# Patient Record
Sex: MALE
[De-identification: names, ages, dates, MRNs, and addresses within clinical notes are randomized per-mention and may not be internally consistent; named-entity substitution may affect disease eponyms.]

---

## 2017-01-11 ENCOUNTER — APPOINTMENT (OUTPATIENT)
Dept: DERMATOLOGY | Facility: CLINIC | Age: 77
End: 2017-01-11

## 2018-01-10 ENCOUNTER — APPOINTMENT (OUTPATIENT)
Dept: DERMATOLOGY | Facility: CLINIC | Age: 78
End: 2018-01-10
Payer: MEDICARE

## 2018-01-10 PROCEDURE — 99214 OFFICE O/P EST MOD 30 MIN: CPT

## 2018-06-18 ENCOUNTER — NON-APPOINTMENT (OUTPATIENT)
Age: 78
End: 2018-06-18

## 2018-06-18 ENCOUNTER — APPOINTMENT (OUTPATIENT)
Dept: INTERNAL MEDICINE | Facility: CLINIC | Age: 78
End: 2018-06-18
Payer: MEDICARE

## 2018-06-18 VITALS — RESPIRATION RATE: 12 BRPM | DIASTOLIC BLOOD PRESSURE: 68 MMHG | HEART RATE: 60 BPM | SYSTOLIC BLOOD PRESSURE: 122 MMHG

## 2018-06-18 VITALS — WEIGHT: 152 LBS | HEIGHT: 70 IN | BODY MASS INDEX: 21.76 KG/M2

## 2018-06-18 DIAGNOSIS — Z82.3 FAMILY HISTORY OF STROKE: ICD-10-CM

## 2018-06-18 DIAGNOSIS — I49.1 ATRIAL PREMATURE DEPOLARIZATION: ICD-10-CM

## 2018-06-18 DIAGNOSIS — Z78.9 OTHER SPECIFIED HEALTH STATUS: ICD-10-CM

## 2018-06-18 PROCEDURE — 82270 OCCULT BLOOD FECES: CPT

## 2018-06-18 PROCEDURE — 36415 COLL VENOUS BLD VENIPUNCTURE: CPT

## 2018-06-18 PROCEDURE — G0439: CPT

## 2018-06-18 PROCEDURE — G0438: CPT

## 2018-06-18 PROCEDURE — 93000 ELECTROCARDIOGRAM COMPLETE: CPT

## 2018-06-18 NOTE — HEALTH RISK ASSESSMENT
[Excellent] : ~his/her~  mood as  excellent [No falls in past year] : Patient reported no falls in the past year [HIV test declined] : HIV test declined [College] : College [] :  [# Of Children ___] : has [unfilled] children [Sexually Active] : sexually active [Fully functional (bathing, dressing, toileting, transferring, walking, feeding)] : Fully functional (bathing, dressing, toileting, transferring, walking, feeding) [Fully functional (using the telephone, shopping, preparing meals, housekeeping, doing laundry, using] : Fully functional and needs no help or supervision to perform IADLs (using the telephone, shopping, preparing meals, housekeeping, doing laundry, using transportation, managing medications and managing finances) [Reports changes in hearing] : Reports changes in hearing [Smoke Detector] : smoke detector [Safety elements used in home] : safety elements used in home [Patient declined discussion] : Patient declined discussion [] : No [Change in mental status noted] : No change in mental status noted [Language] : denies difficulty with language [Behavior] : denies difficulty with behavior [Learning/Retaining New Information] : denies difficulty learning/retaining new information [Handling Complex Tasks] : denies difficulty handling complex tasks [Reasoning] : denies difficulty with reasoning [Spatial Ability and Orientation] : denies difficulty with spatial ability and orientation [High Risk Behavior] : no high risk behavior [Reports changes in vision] : Reports no changes in vision [Reports changes in dental health] : Reports no changes in dental health [Travel to Developing Areas] : does not  travel to developing areas [ColonoscopyDate] : 205 [de-identified] : uses hearin aides

## 2018-06-18 NOTE — ASSESSMENT
[FreeTextEntry1] : check labs\par ekg with occasional pac, not pathologic\par if gets further light headedness consider holter\par

## 2018-06-18 NOTE — HISTORY OF PRESENT ILLNESS
[FreeTextEntry1] : For CPE [de-identified] : For CPE.  Has no health issues to report\par He has no chest pain sob nvd, had recent uri and has felt a bit light headed on occaion\par No palpitations or other symptoms ot report

## 2018-06-18 NOTE — PHYSICAL EXAM

## 2018-06-19 LAB
25(OH)D3 SERPL-MCNC: 47.7 NG/ML
ALBUMIN SERPL ELPH-MCNC: 4.5 G/DL
ALP BLD-CCNC: 59 U/L
ALT SERPL-CCNC: 16 U/L
ANION GAP SERPL CALC-SCNC: 12 MMOL/L
APPEARANCE: CLEAR
AST SERPL-CCNC: 21 U/L
BASOPHILS # BLD AUTO: 0.03 K/UL
BASOPHILS NFR BLD AUTO: 0.5 %
BILIRUB SERPL-MCNC: 0.4 MG/DL
BILIRUBIN URINE: NEGATIVE
BLOOD URINE: NEGATIVE
BUN SERPL-MCNC: 25 MG/DL
CALCIUM SERPL-MCNC: 9.5 MG/DL
CHLORIDE SERPL-SCNC: 105 MMOL/L
CHOLEST SERPL-MCNC: 178 MG/DL
CHOLEST/HDLC SERPL: 3.2 RATIO
CO2 SERPL-SCNC: 26 MMOL/L
COLOR: YELLOW
CREAT SERPL-MCNC: 1.01 MG/DL
CREAT SPEC-SCNC: 130 MG/DL
EOSINOPHIL # BLD AUTO: 0.16 K/UL
EOSINOPHIL NFR BLD AUTO: 2.7 %
GLUCOSE QUALITATIVE U: NEGATIVE MG/DL
GLUCOSE SERPL-MCNC: 103 MG/DL
HBA1C MFR BLD HPLC: 5.5 %
HCT VFR BLD CALC: 42.9 %
HDLC SERPL-MCNC: 55 MG/DL
HGB BLD-MCNC: 13.2 G/DL
IMM GRANULOCYTES NFR BLD AUTO: 0.2 %
KETONES URINE: ABNORMAL
LDLC SERPL CALC-MCNC: 104 MG/DL
LEUKOCYTE ESTERASE URINE: NEGATIVE
LYMPHOCYTES # BLD AUTO: 1.22 K/UL
LYMPHOCYTES NFR BLD AUTO: 20.2 %
MAN DIFF?: NORMAL
MCHC RBC-ENTMCNC: 29.3 PG
MCHC RBC-ENTMCNC: 30.8 GM/DL
MCV RBC AUTO: 95.1 FL
MICROALBUMIN 24H UR DL<=1MG/L-MCNC: 0.5 MG/DL
MICROALBUMIN/CREAT 24H UR-RTO: 4 MG/G
MONOCYTES # BLD AUTO: 0.38 K/UL
MONOCYTES NFR BLD AUTO: 6.3 %
NEUTROPHILS # BLD AUTO: 4.23 K/UL
NEUTROPHILS NFR BLD AUTO: 70.1 %
NITRITE URINE: NEGATIVE
PH URINE: 5.5
PLATELET # BLD AUTO: 285 K/UL
POTASSIUM SERPL-SCNC: 4.9 MMOL/L
PROT SERPL-MCNC: 7 G/DL
PROTEIN URINE: NEGATIVE MG/DL
PSA SERPL-MCNC: 0.71 NG/ML
RBC # BLD: 4.51 M/UL
RBC # FLD: 14.4 %
SODIUM SERPL-SCNC: 143 MMOL/L
SPECIFIC GRAVITY URINE: 1.02
T4 FREE SERPL-MCNC: 1.2 NG/DL
TRIGL SERPL-MCNC: 93 MG/DL
TSH SERPL-ACNC: 2.31 UIU/ML
UROBILINOGEN URINE: NEGATIVE MG/DL
WBC # FLD AUTO: 6.03 K/UL

## 2018-07-13 ENCOUNTER — RECORD ABSTRACTING (OUTPATIENT)
Age: 78
End: 2018-07-13

## 2018-10-29 ENCOUNTER — APPOINTMENT (OUTPATIENT)
Dept: GASTROENTEROLOGY | Facility: CLINIC | Age: 78
End: 2018-10-29
Payer: MEDICARE

## 2018-10-29 VITALS
OXYGEN SATURATION: 98 % | WEIGHT: 156 LBS | HEART RATE: 62 BPM | BODY MASS INDEX: 22.33 KG/M2 | DIASTOLIC BLOOD PRESSURE: 88 MMHG | SYSTOLIC BLOOD PRESSURE: 126 MMHG | HEIGHT: 70 IN | RESPIRATION RATE: 16 BRPM

## 2018-10-29 PROCEDURE — 99213 OFFICE O/P EST LOW 20 MIN: CPT

## 2019-01-11 ENCOUNTER — APPOINTMENT (OUTPATIENT)
Dept: DERMATOLOGY | Facility: CLINIC | Age: 79
End: 2019-01-11
Payer: MEDICARE

## 2019-01-11 PROCEDURE — 99213 OFFICE O/P EST LOW 20 MIN: CPT

## 2019-07-16 ENCOUNTER — NON-APPOINTMENT (OUTPATIENT)
Age: 79
End: 2019-07-16

## 2019-07-16 ENCOUNTER — APPOINTMENT (OUTPATIENT)
Dept: INTERNAL MEDICINE | Facility: CLINIC | Age: 79
End: 2019-07-16
Payer: MEDICARE

## 2019-07-16 VITALS
BODY MASS INDEX: 22.19 KG/M2 | HEART RATE: 68 BPM | DIASTOLIC BLOOD PRESSURE: 82 MMHG | HEIGHT: 70 IN | SYSTOLIC BLOOD PRESSURE: 120 MMHG | WEIGHT: 155 LBS | RESPIRATION RATE: 12 BRPM

## 2019-07-16 PROCEDURE — 36415 COLL VENOUS BLD VENIPUNCTURE: CPT

## 2019-07-16 PROCEDURE — 93000 ELECTROCARDIOGRAM COMPLETE: CPT

## 2019-07-16 PROCEDURE — G0439: CPT

## 2019-07-16 NOTE — ASSESSMENT
[FreeTextEntry1] : medically stable\par check labs\par flu vaccine in the fall\par due for colonsocopy this fall

## 2019-07-16 NOTE — PHYSICAL EXAM
[No Acute Distress] : no acute distress [Well Nourished] : well nourished [Well Developed] : well developed [Well-Appearing] : well-appearing [Normal Sclera/Conjunctiva] : normal sclera/conjunctiva [PERRL] : pupils equal round and reactive to light [EOMI] : extraocular movements intact [Normal Outer Ear/Nose] : the outer ears and nose were normal in appearance [Normal Oropharynx] : the oropharynx was normal [No JVD] : no jugular venous distention [No Lymphadenopathy] : no lymphadenopathy [Supple] : supple [Thyroid Normal, No Nodules] : the thyroid was normal and there were no nodules present [No Respiratory Distress] : no respiratory distress  [No Accessory Muscle Use] : no accessory muscle use [Clear to Auscultation] : lungs were clear to auscultation bilaterally [Normal Rate] : normal rate  [Regular Rhythm] : with a regular rhythm [Normal S1, S2] : normal S1 and S2 [No Murmur] : no murmur heard [No Carotid Bruits] : no carotid bruits [No Abdominal Bruit] : a ~M bruit was not heard ~T in the abdomen [No Varicosities] : no varicosities [Pedal Pulses Present] : the pedal pulses are present [No Edema] : there was no peripheral edema [No Palpable Aorta] : no palpable aorta [No Extremity Clubbing/Cyanosis] : no extremity clubbing/cyanosis [Soft] : abdomen soft [Non Tender] : non-tender [Non-distended] : non-distended [No Masses] : no abdominal mass palpated [No HSM] : no HSM [Normal Bowel Sounds] : normal bowel sounds [Normal Posterior Cervical Nodes] : no posterior cervical lymphadenopathy [Normal Anterior Cervical Nodes] : no anterior cervical lymphadenopathy [No CVA Tenderness] : no CVA  tenderness [No Spinal Tenderness] : no spinal tenderness [No Joint Swelling] : no joint swelling [Grossly Normal Strength/Tone] : grossly normal strength/tone [No Rash] : no rash [Coordination Grossly Intact] : coordination grossly intact [No Focal Deficits] : no focal deficits [Normal Gait] : normal gait [Deep Tendon Reflexes (DTR)] : deep tendon reflexes were 2+ and symmetric [Normal Affect] : the affect was normal [Normal Insight/Judgement] : insight and judgment were intact [FreeTextEntry1] : flat prostate

## 2019-07-16 NOTE — HEALTH RISK ASSESSMENT
[Excellent] : ~his/her~  mood as  excellent [Yes] : Yes [1 or 2 (0 pts)] : 1 or 2 (0 points) [No falls in past year] : Patient reported no falls in the past year [0] : 2) Feeling down, depressed, or hopeless: Not at all (0) [None] : None [Employed] : employed [Retired] : retired [Sexually Active] : sexually active [Feels Safe at Home] : Feels safe at home [Fully functional (bathing, dressing, toileting, transferring, walking, feeding)] : Fully functional (bathing, dressing, toileting, transferring, walking, feeding) [Reports changes in dental health] : Reports changes in dental health [Safety elements used in home] : safety elements used in home [Seat Belt] :  uses seat belt [Sunscreen] : uses sunscreen [With Patient/Caregiver] : With Patient/Caregiver [Designated Healthcare Proxy] : Designated healthcare proxy [Name: ___] : Health Care Proxy's Name: [unfilled]  [Relationship: ___] : Relationship: [unfilled] [I will adhere to the patient's wishes as expressed in the advance directive except as noted below.] : I will adhere to the patient's wishes as expressed in the advance directive except as noted below [] : No [Change in mental status noted] : No change in mental status noted [Language] : denies difficulty with language [Behavior] : denies difficulty with behavior [Learning/Retaining New Information] : denies difficulty learning/retaining new information [Handling Complex Tasks] : denies difficulty handling complex tasks [Reasoning] : denies difficulty with reasoning [Spatial Ability and Orientation] : denies difficulty with spatial ability and orientation [High Risk Behavior] : no high risk behavior [Reports changes in hearing] : Reports no changes in hearing [Reports changes in vision] : Reports no changes in vision [Reports normal functional visual acuity (ie: able to read med bottle)] : Reports poor functional visual acuity.  [Smoke Detector] : no smoke detector [Carbon Monoxide Detector] : no carbon monoxide detector [Guns at Home] : no guns at home [Travel to Developing Areas] : does not  travel to developing areas [ColonoscopyDate] : 2016 [ColonoscopyComments] : polyps [de-identified] : uses hearing aids [AdvancecareDate] : 7/16/17

## 2019-07-17 LAB
25(OH)D3 SERPL-MCNC: 40.1 NG/ML
ALBUMIN SERPL ELPH-MCNC: 4.2 G/DL
ALP BLD-CCNC: 52 U/L
ALT SERPL-CCNC: 14 U/L
ANION GAP SERPL CALC-SCNC: 15 MMOL/L
APPEARANCE: CLEAR
AST SERPL-CCNC: 18 U/L
BASOPHILS # BLD AUTO: 0.05 K/UL
BASOPHILS NFR BLD AUTO: 0.8 %
BILIRUB SERPL-MCNC: 0.4 MG/DL
BILIRUBIN URINE: NEGATIVE
BLOOD URINE: NEGATIVE
BUN SERPL-MCNC: 26 MG/DL
CALCIUM SERPL-MCNC: 9.6 MG/DL
CHLORIDE SERPL-SCNC: 106 MMOL/L
CHOLEST SERPL-MCNC: 170 MG/DL
CHOLEST/HDLC SERPL: 3.5 RATIO
CO2 SERPL-SCNC: 24 MMOL/L
COLOR: YELLOW
CREAT SERPL-MCNC: 1.09 MG/DL
CREAT SPEC-SCNC: 151 MG/DL
EOSINOPHIL # BLD AUTO: 0.2 K/UL
EOSINOPHIL NFR BLD AUTO: 3.3 %
ESTIMATED AVERAGE GLUCOSE: 105 MG/DL
GLUCOSE QUALITATIVE U: NEGATIVE
GLUCOSE SERPL-MCNC: 82 MG/DL
HBA1C MFR BLD HPLC: 5.3 %
HCT VFR BLD CALC: 42.9 %
HDLC SERPL-MCNC: 48 MG/DL
HGB BLD-MCNC: 13.3 G/DL
IMM GRANULOCYTES NFR BLD AUTO: 0.2 %
KETONES URINE: NEGATIVE
LDLC SERPL CALC-MCNC: 100 MG/DL
LEUKOCYTE ESTERASE URINE: NEGATIVE
LYMPHOCYTES # BLD AUTO: 1.2 K/UL
LYMPHOCYTES NFR BLD AUTO: 19.5 %
MAN DIFF?: NORMAL
MCHC RBC-ENTMCNC: 30.6 PG
MCHC RBC-ENTMCNC: 31 GM/DL
MCV RBC AUTO: 98.8 FL
MICROALBUMIN 24H UR DL<=1MG/L-MCNC: <1.2 MG/DL
MICROALBUMIN/CREAT 24H UR-RTO: NORMAL MG/G
MONOCYTES # BLD AUTO: 0.41 K/UL
MONOCYTES NFR BLD AUTO: 6.7 %
NEUTROPHILS # BLD AUTO: 4.28 K/UL
NEUTROPHILS NFR BLD AUTO: 69.5 %
NITRITE URINE: NEGATIVE
PH URINE: 5.5
PLATELET # BLD AUTO: 242 K/UL
POTASSIUM SERPL-SCNC: 4.2 MMOL/L
PROT SERPL-MCNC: 6.6 G/DL
PROTEIN URINE: NORMAL
PSA SERPL-MCNC: 0.98 NG/ML
RBC # BLD: 4.34 M/UL
RBC # FLD: 13.7 %
SODIUM SERPL-SCNC: 144 MMOL/L
SPECIFIC GRAVITY URINE: 1.03
T4 FREE SERPL-MCNC: 1.2 NG/DL
TRIGL SERPL-MCNC: 110 MG/DL
TSH SERPL-ACNC: 2.46 UIU/ML
UROBILINOGEN URINE: NORMAL
WBC # FLD AUTO: 6.15 K/UL

## 2019-07-18 ENCOUNTER — CHART COPY (OUTPATIENT)
Age: 79
End: 2019-07-18

## 2019-09-27 ENCOUNTER — APPOINTMENT (OUTPATIENT)
Dept: INTERNAL MEDICINE | Facility: CLINIC | Age: 79
End: 2019-09-27
Payer: MEDICARE

## 2019-09-27 PROCEDURE — G0008: CPT

## 2019-09-27 PROCEDURE — 90662 IIV NO PRSV INCREASED AG IM: CPT

## 2019-10-18 ENCOUNTER — APPOINTMENT (OUTPATIENT)
Dept: GASTROENTEROLOGY | Facility: CLINIC | Age: 79
End: 2019-10-18
Payer: MEDICARE

## 2019-10-18 VITALS
OXYGEN SATURATION: 98 % | SYSTOLIC BLOOD PRESSURE: 123 MMHG | HEART RATE: 70 BPM | BODY MASS INDEX: 22.33 KG/M2 | RESPIRATION RATE: 14 BRPM | WEIGHT: 156 LBS | HEIGHT: 70 IN | DIASTOLIC BLOOD PRESSURE: 80 MMHG

## 2019-10-18 DIAGNOSIS — Z86.010 PERSONAL HISTORY OF COLONIC POLYPS: ICD-10-CM

## 2019-10-18 PROCEDURE — 99214 OFFICE O/P EST MOD 30 MIN: CPT

## 2019-10-18 NOTE — HISTORY OF PRESENT ILLNESS
[FreeTextEntry1] : 79-year-old white male, with no significant past medical or surgical history. Patient had undergone a screening colonoscopy. 3+ years ago, showing multiple small tubular adenomas, at least 5 possibly 6. All were resected. No postprocedural issues. Patient has remained asymptomatic from a lower GI point of view, ever since. He is currently taking no medications. He feels completely well. His bowel movements are normal. No abdominal pain, weight loss, rectal bleeding or alteration in bowel habits. No upper GI symptoms.

## 2019-10-18 NOTE — PHYSICAL EXAM
[Sclera] : the sclera and conjunctiva were normal [PERRL With Normal Accommodation] : pupils were equal in size, round, and reactive to light [Extraocular Movements] : extraocular movements were intact [Neck Appearance] : the appearance of the neck was normal [Neck Cervical Mass (___cm)] : no neck mass was observed [Jugular Venous Distention Increased] : there was no jugular-venous distention [Thyroid Diffuse Enlargement] : the thyroid was not enlarged [Thyroid Nodule] : there were no palpable thyroid nodules [Auscultation Breath Sounds / Voice Sounds] : lungs were clear to auscultation bilaterally [Heart Rate And Rhythm] : heart rate was normal and rhythm regular [Heart Sounds] : normal S1 and S2 [Heart Sounds Gallop] : no gallops [Murmurs] : no murmurs [Heart Sounds Pericardial Friction Rub] : no pericardial rub [Bowel Sounds] : normal bowel sounds [Abdomen Soft] : soft [Abdomen Tenderness] : non-tender [Abdomen Mass (___ Cm)] : no abdominal mass palpated [No CVA Tenderness] : no ~M costovertebral angle tenderness [No Spinal Tenderness] : no spinal tenderness [Abnormal Walk] : normal gait [Nail Clubbing] : no clubbing  or cyanosis of the fingernails [Musculoskeletal - Swelling] : no joint swelling seen [Motor Tone] : muscle strength and tone were normal [Skin Color & Pigmentation] : normal skin color and pigmentation [Skin Turgor] : normal skin turgor [] : no rash

## 2019-10-18 NOTE — ASSESSMENT
[FreeTextEntry1] : Personal history of multiple small tubular adenomas found at a screening colonoscopy. 3-1/2 years ago. Patient was advised at that time. Mental colonoscopy in 3 years. As such. He is an appropriate candidate to schedule a surveillance colonoscopy. He is asymptomatic from a lower GI point of view. His physical exam was normal. He is taking no medications. ASA #1 surveillance colonoscopy is indicated. The procedure, its risks, benefits, and prepped, were explained to patient, who understands and is agreeable to proceed. ASA #1. Suprep will be utilized. Blood work from recent physical exam will be reviewed. Patient is in optimal medical condition to undergo planned procedure. Results to follow. Arrangements made.

## 2019-10-18 NOTE — CONSULT LETTER
[Dear  ___] : Dear  [unfilled], [Consult Letter:] : I had the pleasure of evaluating your patient, [unfilled]. [Please see my note below.] : Please see my note below. [Consult Closing:] : Thank you very much for allowing me to participate in the care of this patient.  If you have any questions, please do not hesitate to contact me. [Sincerely,] : Sincerely, [FreeTextEntry1] : Personal history of multiple colon polyps, tubular adenomas. Surveillance colonoscopy is appropriate and has been arranged. [FreeTextEntry3] : Maykel Yanes MD FACG\par Diplomate American Board of Internal Medicine and Gastroenterolgy\par [facility]\par

## 2020-01-09 ENCOUNTER — RESULT REVIEW (OUTPATIENT)
Age: 80
End: 2020-01-09

## 2020-01-10 ENCOUNTER — APPOINTMENT (OUTPATIENT)
Dept: DERMATOLOGY | Facility: CLINIC | Age: 80
End: 2020-01-10
Payer: MEDICARE

## 2020-01-10 PROCEDURE — 11102 TANGNTL BX SKIN SINGLE LES: CPT

## 2020-01-10 PROCEDURE — 99213 OFFICE O/P EST LOW 20 MIN: CPT | Mod: 25

## 2020-01-22 ENCOUNTER — OUTPATIENT (OUTPATIENT)
Dept: OUTPATIENT SERVICES | Facility: HOSPITAL | Age: 80
LOS: 1 days | End: 2020-01-22
Payer: MEDICARE

## 2020-01-22 ENCOUNTER — APPOINTMENT (OUTPATIENT)
Dept: GASTROENTEROLOGY | Facility: GI CENTER | Age: 80
End: 2020-01-22
Payer: MEDICARE

## 2020-01-22 DIAGNOSIS — Z86.010 PERSONAL HISTORY OF COLONIC POLYPS: ICD-10-CM

## 2020-01-22 DIAGNOSIS — K64.8 OTHER HEMORRHOIDS: ICD-10-CM

## 2020-01-22 PROCEDURE — G0105: CPT

## 2020-01-22 PROCEDURE — 45378 DIAGNOSTIC COLONOSCOPY: CPT

## 2020-01-22 NOTE — HISTORY OF PRESENT ILLNESS
[FreeTextEntry1] : 78 yo WM c No PMH PH of colon polyps TA's.  Last colonoscopy 3.5 yrs ago.\par Asymptomatic.  No new  medical issues.  Prior BW was normal.

## 2020-01-22 NOTE — HISTORY OF PRESENT ILLNESS
[FreeTextEntry1] : 80 yo WM c No PMH PH of colon polyps TA's.  Last colonoscopy 3.5 yrs ago.\par Asymptomatic.  No new  medical issues.  Prior BW was normal.

## 2020-01-22 NOTE — PHYSICAL EXAM
[Sclera] : the sclera and conjunctiva were normal [PERRL With Normal Accommodation] : pupils were equal in size, round, and reactive to light [Extraocular Movements] : extraocular movements were intact [Neck Appearance] : the appearance of the neck was normal [Neck Cervical Mass (___cm)] : no neck mass was observed [Jugular Venous Distention Increased] : there was no jugular-venous distention [Thyroid Diffuse Enlargement] : the thyroid was not enlarged [Thyroid Nodule] : there were no palpable thyroid nodules [Auscultation Breath Sounds / Voice Sounds] : lungs were clear to auscultation bilaterally [Heart Rate And Rhythm] : heart rate was normal and rhythm regular [Heart Sounds] : normal S1 and S2 [Heart Sounds Gallop] : no gallops [Heart Sounds Pericardial Friction Rub] : no pericardial rub [Murmurs] : no murmurs [Bowel Sounds] : normal bowel sounds [Abdomen Soft] : soft [Abdomen Tenderness] : non-tender [Abdomen Mass (___ Cm)] : no abdominal mass palpated [No CVA Tenderness] : no ~M costovertebral angle tenderness [No Spinal Tenderness] : no spinal tenderness [Musculoskeletal - Swelling] : no joint swelling seen [Nail Clubbing] : no clubbing  or cyanosis of the fingernails [Abnormal Walk] : normal gait [Skin Color & Pigmentation] : normal skin color and pigmentation [Motor Tone] : muscle strength and tone were normal [] : no rash [Skin Turgor] : normal skin turgor

## 2020-01-22 NOTE — PROCEDURE
[Hx of Colon Polyps] : history of colon polyps [Procedure Explained] : The procedure was explained [Allergies Reviewed] : allergies reviewed. [Risks] : Risks [Alternatives] : alternatives [Benefits] : benefits [Patient] : the patient [Consent Obtained] : written consent was obtained prior to the procedure and is detailed in the patient's record [Bowel Prep Kit] : the patient took the appropriate bowel preparation kit as directed [Automated Blood Pressure Cuff] : automated blood pressure cuff [Approved Diet Followed] : the patient avoided solid foods and adhered to the approved diet list for 24 hours prior to the procedure [Pulse Oximeter] : pulse oximeter [Cardiac Monitor] : cardiac monitor [2] : 2 [Propofol ___ mg IV] : Propofol [unfilled] ~Umg intravenously [Prep Qualtiy: ___] : Prep Quality:  [unfilled] [Withdrawal Time: ___] : Withdrawal Time:  [unfilled] [Left Lateral Decubitus] : The patient was positioned in the left lateral decubitus position [Abnormal Rectum] : a normal rectum [External Hemorrhoids] : no external hemorrhoids [Normal Prostate] : a normal prostate [Cecum (Landmarks/Transillum)] : and guided to the cecum which was identified by the anatomic landmarks of the appendiceal orifice and ileocecal valve and by transillumination in the right lower quadrant [Insufflated] : insufflated [Moderate Difficulty] : with moderate difficulty [Multiple Passes Needed] : after multiple passes [Retroflex View] : a retroflex view of the rectum was performed [AVM] : AVM [Normal] : Normal [Hemorrhoids] : hemorrhoids [Tolerated Well] : the patient tolerated the procedure well [Vital Signs Stable] : the vital signs were stable [No Complications] : There were no complications [de-identified] : [id number] [de-identified] : Suprep; Split.   [de-identified] : Multiple loops and tight turns.  Cecum eventually reached.   [de-identified] : Moderate internal hemorrhoids, grade 2.  Inactive  \par No Proctitis.  Normal retroflexion exam.   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  No bleeding or hx of bleeding.  No intervention done.  No TI .  ICV barely noticable.   [de-identified] : 2 AVM's of the cecum.  Internal hemorrhoids.  No neoplasia.

## 2020-01-22 NOTE — PROCEDURE
[Hx of Colon Polyps] : history of colon polyps [Procedure Explained] : The procedure was explained [Allergies Reviewed] : allergies reviewed. [Benefits] : benefits [Risks] : Risks [Alternatives] : alternatives [Consent Obtained] : written consent was obtained prior to the procedure and is detailed in the patient's record [Patient] : the patient [Automated Blood Pressure Cuff] : automated blood pressure cuff [Approved Diet Followed] : the patient avoided solid foods and adhered to the approved diet list for 24 hours prior to the procedure [Bowel Prep Kit] : the patient took the appropriate bowel preparation kit as directed [Pulse Oximeter] : pulse oximeter [Cardiac Monitor] : cardiac monitor [Propofol ___ mg IV] : Propofol [unfilled] ~Umg intravenously [2] : 2 [Prep Qualtiy: ___] : Prep Quality:  [unfilled] [Withdrawal Time: ___] : Withdrawal Time:  [unfilled] [Abnormal Rectum] : a normal rectum [Left Lateral Decubitus] : The patient was positioned in the left lateral decubitus position [External Hemorrhoids] : no external hemorrhoids [Normal Prostate] : a normal prostate [Cecum (Landmarks/Transillum)] : and guided to the cecum which was identified by the anatomic landmarks of the appendiceal orifice and ileocecal valve and by transillumination in the right lower quadrant [Moderate Difficulty] : with moderate difficulty [Insufflated] : insufflated [Multiple Passes Needed] : after multiple passes [Retroflex View] : a retroflex view of the rectum was performed [AVM] : AVM [Hemorrhoids] : hemorrhoids [Normal] : Normal [Tolerated Well] : the patient tolerated the procedure well [Vital Signs Stable] : the vital signs were stable [No Complications] : There were no complications [de-identified] : [id number] [de-identified] : Suprep; Split.   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  No bleeding or hx of bleeding.  No intervention done.  No TI .  ICV barely noticable.   [de-identified] : Moderate internal hemorrhoids, grade 2.  Inactive  \par No Proctitis.  Normal retroflexion exam.   [de-identified] : Multiple loops and tight turns.  Cecum eventually reached.   [de-identified] : 2 AVM's of the cecum.  Internal hemorrhoids.  No neoplasia.

## 2020-01-22 NOTE — PHYSICAL EXAM
[Sclera] : the sclera and conjunctiva were normal [PERRL With Normal Accommodation] : pupils were equal in size, round, and reactive to light [Extraocular Movements] : extraocular movements were intact [Neck Appearance] : the appearance of the neck was normal [Neck Cervical Mass (___cm)] : no neck mass was observed [Jugular Venous Distention Increased] : there was no jugular-venous distention [Thyroid Diffuse Enlargement] : the thyroid was not enlarged [Thyroid Nodule] : there were no palpable thyroid nodules [Auscultation Breath Sounds / Voice Sounds] : lungs were clear to auscultation bilaterally [Heart Rate And Rhythm] : heart rate was normal and rhythm regular [Heart Sounds] : normal S1 and S2 [Heart Sounds Pericardial Friction Rub] : no pericardial rub [Heart Sounds Gallop] : no gallops [Murmurs] : no murmurs [Abdomen Soft] : soft [Bowel Sounds] : normal bowel sounds [Abdomen Tenderness] : non-tender [Abdomen Mass (___ Cm)] : no abdominal mass palpated [No CVA Tenderness] : no ~M costovertebral angle tenderness [No Spinal Tenderness] : no spinal tenderness [Abnormal Walk] : normal gait [Musculoskeletal - Swelling] : no joint swelling seen [Nail Clubbing] : no clubbing  or cyanosis of the fingernails [Skin Color & Pigmentation] : normal skin color and pigmentation [Motor Tone] : muscle strength and tone were normal [] : no rash [Skin Turgor] : normal skin turgor

## 2020-01-22 NOTE — ASSESSMENT
[FreeTextEntry1] : Surveillance colonoscopy negative for neoplasia.  Plan:  repeat in 5 yrs;  \par \par AVM's:  asymptomatic;  No intervention.  \par \par Moderate internal hemorrhoids:  High fiber diet;  topicals prn.

## 2020-01-29 ENCOUNTER — APPOINTMENT (OUTPATIENT)
Dept: DERMATOLOGY | Facility: CLINIC | Age: 80
End: 2020-01-29
Payer: MEDICARE

## 2020-01-29 PROCEDURE — 17261 DSTRJ MAL LES T/A/L .6-1.0CM: CPT

## 2020-08-19 ENCOUNTER — APPOINTMENT (OUTPATIENT)
Dept: DERMATOLOGY | Facility: CLINIC | Age: 80
End: 2020-08-19
Payer: MEDICARE

## 2020-08-19 PROCEDURE — 17000 DESTRUCT PREMALG LESION: CPT

## 2020-08-19 PROCEDURE — 99214 OFFICE O/P EST MOD 30 MIN: CPT | Mod: 25

## 2020-10-27 ENCOUNTER — APPOINTMENT (OUTPATIENT)
Dept: INTERNAL MEDICINE | Facility: CLINIC | Age: 80
End: 2020-10-27
Payer: MEDICARE

## 2020-10-27 ENCOUNTER — NON-APPOINTMENT (OUTPATIENT)
Age: 80
End: 2020-10-27

## 2020-10-27 VITALS
BODY MASS INDEX: 21.9 KG/M2 | HEART RATE: 68 BPM | HEIGHT: 70 IN | WEIGHT: 153 LBS | SYSTOLIC BLOOD PRESSURE: 112 MMHG | DIASTOLIC BLOOD PRESSURE: 78 MMHG | RESPIRATION RATE: 16 BRPM

## 2020-10-27 DIAGNOSIS — K55.21 ANGIODYSPLASIA OF COLON WITH HEMORRHAGE: ICD-10-CM

## 2020-10-27 DIAGNOSIS — Z23 ENCOUNTER FOR IMMUNIZATION: ICD-10-CM

## 2020-10-27 DIAGNOSIS — Z11.59 ENCOUNTER FOR SCREENING FOR OTHER VIRAL DISEASES: ICD-10-CM

## 2020-10-27 LAB
BASOPHILS # BLD AUTO: 0.05 K/UL
BASOPHILS NFR BLD AUTO: 0.9 %
EOSINOPHIL # BLD AUTO: 0.23 K/UL
EOSINOPHIL NFR BLD AUTO: 4.3 %
HCT VFR BLD CALC: 41.6 %
HGB BLD-MCNC: 13.7 G/DL
IMM GRANULOCYTES NFR BLD AUTO: 0.2 %
LYMPHOCYTES # BLD AUTO: 1.19 K/UL
LYMPHOCYTES NFR BLD AUTO: 22.2 %
MAN DIFF?: NORMAL
MCHC RBC-ENTMCNC: 30 PG
MCHC RBC-ENTMCNC: 32.9 GM/DL
MCV RBC AUTO: 91.2 FL
MONOCYTES # BLD AUTO: 0.32 K/UL
MONOCYTES NFR BLD AUTO: 6 %
NEUTROPHILS # BLD AUTO: 3.55 K/UL
NEUTROPHILS NFR BLD AUTO: 66.4 %
PLATELET # BLD AUTO: 265 K/UL
RBC # BLD: 4.56 M/UL
RBC # FLD: 13.2 %
WBC # FLD AUTO: 5.35 K/UL

## 2020-10-27 PROCEDURE — 90662 IIV NO PRSV INCREASED AG IM: CPT

## 2020-10-27 PROCEDURE — 93000 ELECTROCARDIOGRAM COMPLETE: CPT

## 2020-10-27 PROCEDURE — 36415 COLL VENOUS BLD VENIPUNCTURE: CPT

## 2020-10-27 PROCEDURE — G0439: CPT | Mod: CS

## 2020-10-27 PROCEDURE — G0008: CPT

## 2020-10-27 RX ORDER — SODIUM SULFATE, POTASSIUM SULFATE, MAGNESIUM SULFATE 17.5; 3.13; 1.6 G/ML; G/ML; G/ML
17.5-3.13-1.6 SOLUTION, CONCENTRATE ORAL
Qty: 1 | Refills: 0 | Status: DISCONTINUED | COMMUNITY
Start: 2019-10-18 | End: 2020-10-27

## 2020-10-27 NOTE — ASSESSMENT
[FreeTextEntry1] : check labs\par appears in excellent health\par flu vaccine given\par follow up year\par ekg unchanged occasional PAC

## 2020-10-27 NOTE — HEALTH RISK ASSESSMENT
[Excellent] : ~his/her~  mood as  excellent [] : No [Yes] : Yes [2 - 4 times a month (2 pts)] : 2-4 times a month (2 points) [0] : 2) Feeling down, depressed, or hopeless: Not at all (0) [Change in mental status noted] : No change in mental status noted [Language] : denies difficulty with language [Behavior] : denies difficulty with behavior [Learning/Retaining New Information] : denies difficulty learning/retaining new information [Handling Complex Tasks] : denies difficulty handling complex tasks [Reasoning] : denies difficulty with reasoning [Spatial Ability and Orientation] : denies difficulty with spatial ability and orientation [None] : None [With Significant Other] : lives with significant other [] :  [# Of Children ___] : has [unfilled] children [Sexually Active] : sexually active [High Risk Behavior] : no high risk behavior [Feels Safe at Home] : Feels safe at home [Fully functional (bathing, dressing, toileting, transferring, walking, feeding)] : Fully functional (bathing, dressing, toileting, transferring, walking, feeding) [Fully functional (using the telephone, shopping, preparing meals, housekeeping, doing laundry, using] : Fully functional and needs no help or supervision to perform IADLs (using the telephone, shopping, preparing meals, housekeeping, doing laundry, using transportation, managing medications and managing finances) [Reports changes in hearing] : Reports no changes in hearing [Reports changes in vision] : Reports no changes in vision [Reports changes in dental health] : Reports no changes in dental health [Smoke Detector] : smoke detector [Carbon Monoxide Detector] : carbon monoxide detector [Guns at Home] : no guns at home [Safety elements used in home] : safety elements used in home [Seat Belt] :  uses seat belt [Sunscreen] : uses sunscreen [Travel to Developing Areas] : does not  travel to developing areas [Caregiver Concerns] : does not have caregiver concerns [ColonoscopyDate] : 1/2020 [ColonoscopyComments] : avn [Patient/Caregiver not ready to engage] : Patient/Caregiver not ready to engage [AdvancecareDate] : 10/27/20

## 2020-10-27 NOTE — HISTORY OF PRESENT ILLNESS
[FreeTextEntry1] : For CPE [de-identified] : For CPE\par had colonoscopy this year \par has been well

## 2020-10-28 LAB
25(OH)D3 SERPL-MCNC: 45 NG/ML
ALBUMIN SERPL ELPH-MCNC: 4.3 G/DL
ALP BLD-CCNC: 62 U/L
ALT SERPL-CCNC: 18 U/L
ANION GAP SERPL CALC-SCNC: 11 MMOL/L
APPEARANCE: CLEAR
AST SERPL-CCNC: 21 U/L
BILIRUB SERPL-MCNC: 0.5 MG/DL
BILIRUBIN URINE: NEGATIVE
BLOOD URINE: NEGATIVE
BUN SERPL-MCNC: 18 MG/DL
CALCIUM SERPL-MCNC: 9.5 MG/DL
CHLORIDE SERPL-SCNC: 103 MMOL/L
CHOLEST SERPL-MCNC: 185 MG/DL
CO2 SERPL-SCNC: 25 MMOL/L
COLOR: NORMAL
CREAT SERPL-MCNC: 1 MG/DL
CREAT SPEC-SCNC: 23 MG/DL
ESTIMATED AVERAGE GLUCOSE: 108 MG/DL
GLUCOSE QUALITATIVE U: NEGATIVE
GLUCOSE SERPL-MCNC: 86 MG/DL
HBA1C MFR BLD HPLC: 5.4 %
HDLC SERPL-MCNC: 53 MG/DL
KETONES URINE: NEGATIVE
LDLC SERPL CALC-MCNC: 114 MG/DL
LEUKOCYTE ESTERASE URINE: NEGATIVE
MICROALBUMIN 24H UR DL<=1MG/L-MCNC: <1.2 MG/DL
MICROALBUMIN/CREAT 24H UR-RTO: NORMAL MG/G
NITRITE URINE: NEGATIVE
NONHDLC SERPL-MCNC: 132 MG/DL
PH URINE: 6
POTASSIUM SERPL-SCNC: 4.7 MMOL/L
PROT SERPL-MCNC: 6.8 G/DL
PROTEIN URINE: NEGATIVE
PSA SERPL-MCNC: 0.65 NG/ML
SARS-COV-2 IGG SERPL IA-ACNC: <3.8 AU/ML
SARS-COV-2 IGG SERPL QL IA: NEGATIVE
SODIUM SERPL-SCNC: 139 MMOL/L
SPECIFIC GRAVITY URINE: 1
T4 FREE SERPL-MCNC: 1.2 NG/DL
TRIGL SERPL-MCNC: 88 MG/DL
TSH SERPL-ACNC: 2.91 UIU/ML
UROBILINOGEN URINE: NORMAL

## 2020-10-29 ENCOUNTER — NON-APPOINTMENT (OUTPATIENT)
Age: 80
End: 2020-10-29

## 2021-01-15 ENCOUNTER — APPOINTMENT (OUTPATIENT)
Dept: DERMATOLOGY | Facility: CLINIC | Age: 81
End: 2021-01-15

## 2021-03-26 ENCOUNTER — APPOINTMENT (OUTPATIENT)
Dept: DERMATOLOGY | Facility: CLINIC | Age: 81
End: 2021-03-26
Payer: MEDICARE

## 2021-03-26 PROCEDURE — 99213 OFFICE O/P EST LOW 20 MIN: CPT

## 2021-11-09 DIAGNOSIS — Z23 ENCOUNTER FOR IMMUNIZATION: ICD-10-CM

## 2021-11-10 ENCOUNTER — APPOINTMENT (OUTPATIENT)
Dept: INTERNAL MEDICINE | Facility: CLINIC | Age: 81
End: 2021-11-10
Payer: MEDICARE

## 2021-11-10 PROCEDURE — 0013A: CPT

## 2021-11-10 PROCEDURE — 90662 IIV NO PRSV INCREASED AG IM: CPT

## 2021-11-10 PROCEDURE — G0008: CPT

## 2022-01-10 ENCOUNTER — APPOINTMENT (OUTPATIENT)
Dept: INTERNAL MEDICINE | Facility: CLINIC | Age: 82
End: 2022-01-10
Payer: MEDICARE

## 2022-01-10 ENCOUNTER — NON-APPOINTMENT (OUTPATIENT)
Age: 82
End: 2022-01-10

## 2022-01-10 VITALS
WEIGHT: 167 LBS | HEART RATE: 72 BPM | DIASTOLIC BLOOD PRESSURE: 78 MMHG | BODY MASS INDEX: 23.91 KG/M2 | HEIGHT: 70 IN | SYSTOLIC BLOOD PRESSURE: 116 MMHG | RESPIRATION RATE: 12 BRPM

## 2022-01-10 VITALS — WEIGHT: 157 LBS | BODY MASS INDEX: 22.53 KG/M2

## 2022-01-10 PROCEDURE — G0439: CPT

## 2022-01-10 PROCEDURE — 93000 ELECTROCARDIOGRAM COMPLETE: CPT | Mod: 59

## 2022-01-10 PROCEDURE — 36415 COLL VENOUS BLD VENIPUNCTURE: CPT

## 2022-01-10 PROCEDURE — G0442 ANNUAL ALCOHOL SCREEN 15 MIN: CPT | Mod: 59

## 2022-01-10 NOTE — HISTORY OF PRESENT ILLNESS
[FreeTextEntry1] : For CPE [de-identified] : For CPE]\par is in outstanding health\par exercises regularly\par has no new health issues to report

## 2022-01-10 NOTE — ASSESSMENT
[FreeTextEntry1] : overall outstanding healthy lifestyle\par keep up good work\par primed for longetivity based on health\par check labs\par ekg normal\par folllow up yearly or prn

## 2022-01-10 NOTE — HEALTH RISK ASSESSMENT
[Excellent] : ~his/her~  mood as  excellent [Never] : Never [Yes] : Yes [2 - 4 times a month (2 pts)] : 2-4 times a month (2 points) [No falls in past year] : Patient reported no falls in the past year [0] : 2) Feeling down, depressed, or hopeless: Not at all (0) [PHQ-2 Negative - No further assessment needed] : PHQ-2 Negative - No further assessment needed [HIV Test offered] : HIV Test offered [Hepatitis C test offered] : Hepatitis C test offered [None] : None [With Significant Other] : lives with significant other [Retired] : retired [College] : College [] :  [Sexually Active] : sexually active [Fully functional (bathing, dressing, toileting, transferring, walking, feeding)] : Fully functional (bathing, dressing, toileting, transferring, walking, feeding) [Fully functional (using the telephone, shopping, preparing meals, housekeeping, doing laundry, using] : Fully functional and needs no help or supervision to perform IADLs (using the telephone, shopping, preparing meals, housekeeping, doing laundry, using transportation, managing medications and managing finances) [Smoke Detector] : smoke detector [Carbon Monoxide Detector] : carbon monoxide detector [Safety elements used in home] : safety elements used in home [Seat Belt] :  uses seat belt [With Patient/Caregiver] : , with patient/caregiver [Name: ___] : Health Care Proxy's Name: [unfilled]  [[id number]] : 0 [Change in mental status noted] : No change in mental status noted [Language] : denies difficulty with language [Behavior] : denies difficulty with behavior [Learning/Retaining New Information] : denies difficulty learning/retaining new information [Handling Complex Tasks] : denies difficulty handling complex tasks [Reasoning] : denies difficulty with reasoning [Spatial Ability and Orientation] : denies difficulty with spatial ability and orientation [High Risk Behavior] : no high risk behavior [Reports changes in hearing] : Reports no changes in hearing [Reports changes in vision] : Reports no changes in vision [Reports normal functional visual acuity (ie: able to read med bottle)] : Reports poor functional visual acuity.  [Reports changes in dental health] : Reports no changes in dental health [Guns at Home] : no guns at home [Sunscreen] : does not use sunscreen [Travel to Developing Areas] : does not  travel to developing areas [TB Exposure] : is not being exposed to tuberculosis [Caregiver Concerns] : does not have caregiver concerns [ColonoscopyDate] : 1/2020 [ColonoscopyComments] : normal [AdvancecareDate] : 1/10/22

## 2022-01-11 ENCOUNTER — NON-APPOINTMENT (OUTPATIENT)
Age: 82
End: 2022-01-11

## 2022-01-11 LAB
25(OH)D3 SERPL-MCNC: 45.2 NG/ML
ALBUMIN SERPL ELPH-MCNC: 4.6 G/DL
ALP BLD-CCNC: 66 U/L
ALT SERPL-CCNC: 14 U/L
ANION GAP SERPL CALC-SCNC: 14 MMOL/L
APPEARANCE: CLEAR
AST SERPL-CCNC: 20 U/L
BASOPHILS # BLD AUTO: 0.04 K/UL
BASOPHILS NFR BLD AUTO: 0.8 %
BILIRUB SERPL-MCNC: 0.6 MG/DL
BILIRUBIN URINE: NEGATIVE
BLOOD URINE: NEGATIVE
BUN SERPL-MCNC: 25 MG/DL
CALCIUM SERPL-MCNC: 9.6 MG/DL
CHLORIDE SERPL-SCNC: 103 MMOL/L
CHOLEST SERPL-MCNC: 196 MG/DL
CO2 SERPL-SCNC: 25 MMOL/L
COLOR: YELLOW
CREAT SERPL-MCNC: 1.14 MG/DL
CREAT SPEC-SCNC: 104 MG/DL
EOSINOPHIL # BLD AUTO: 0.33 K/UL
EOSINOPHIL NFR BLD AUTO: 6.6 %
ESTIMATED AVERAGE GLUCOSE: 111 MG/DL
GLUCOSE QUALITATIVE U: NEGATIVE
GLUCOSE SERPL-MCNC: 87 MG/DL
HBA1C MFR BLD HPLC: 5.5 %
HCT VFR BLD CALC: 44.6 %
HCV AB SER QL: NONREACTIVE
HCV S/CO RATIO: 0.11 S/CO
HDLC SERPL-MCNC: 54 MG/DL
HGB BLD-MCNC: 14.6 G/DL
HIV1+2 AB SPEC QL IA.RAPID: NONREACTIVE
IMM GRANULOCYTES NFR BLD AUTO: 0.6 %
KETONES URINE: NEGATIVE
LDLC SERPL CALC-MCNC: 124 MG/DL
LEUKOCYTE ESTERASE URINE: NEGATIVE
LYMPHOCYTES # BLD AUTO: 1.15 K/UL
LYMPHOCYTES NFR BLD AUTO: 22.9 %
MAN DIFF?: NORMAL
MCHC RBC-ENTMCNC: 31.1 PG
MCHC RBC-ENTMCNC: 32.7 GM/DL
MCV RBC AUTO: 94.9 FL
MICROALBUMIN 24H UR DL<=1MG/L-MCNC: <1.2 MG/DL
MICROALBUMIN/CREAT 24H UR-RTO: NORMAL MG/G
MONOCYTES # BLD AUTO: 0.33 K/UL
MONOCYTES NFR BLD AUTO: 6.6 %
NEUTROPHILS # BLD AUTO: 3.15 K/UL
NEUTROPHILS NFR BLD AUTO: 62.5 %
NITRITE URINE: NEGATIVE
NONHDLC SERPL-MCNC: 142 MG/DL
PH URINE: 6
PLATELET # BLD AUTO: 298 K/UL
POTASSIUM SERPL-SCNC: 4.9 MMOL/L
PROT SERPL-MCNC: 7 G/DL
PROTEIN URINE: NEGATIVE
PSA SERPL-MCNC: 0.69 NG/ML
RBC # BLD: 4.7 M/UL
RBC # FLD: 13.2 %
SODIUM SERPL-SCNC: 142 MMOL/L
SPECIFIC GRAVITY URINE: 1.02
T4 FREE SERPL-MCNC: 1.3 NG/DL
TRIGL SERPL-MCNC: 88 MG/DL
TSH SERPL-ACNC: 3.27 UIU/ML
UROBILINOGEN URINE: NORMAL
WBC # FLD AUTO: 5.03 K/UL

## 2022-03-29 ENCOUNTER — RESULT REVIEW (OUTPATIENT)
Age: 82
End: 2022-03-29

## 2022-03-30 ENCOUNTER — APPOINTMENT (OUTPATIENT)
Dept: DERMATOLOGY | Facility: CLINIC | Age: 82
End: 2022-03-30
Payer: MEDICARE

## 2022-03-30 PROCEDURE — 11102 TANGNTL BX SKIN SINGLE LES: CPT

## 2022-03-30 PROCEDURE — 99213 OFFICE O/P EST LOW 20 MIN: CPT | Mod: 25

## 2022-04-11 PROBLEM — Z11.59 SCREENING FOR VIRAL DISEASE: Status: ACTIVE | Noted: 2020-10-27

## 2022-05-16 ENCOUNTER — APPOINTMENT (OUTPATIENT)
Dept: INTERNAL MEDICINE | Facility: CLINIC | Age: 82
End: 2022-05-16
Payer: MEDICARE

## 2022-05-16 VITALS — RESPIRATION RATE: 12 BRPM | HEART RATE: 68 BPM | DIASTOLIC BLOOD PRESSURE: 78 MMHG | SYSTOLIC BLOOD PRESSURE: 124 MMHG

## 2022-05-16 VITALS — WEIGHT: 160 LBS | BODY MASS INDEX: 22.96 KG/M2

## 2022-05-16 DIAGNOSIS — H26.9 UNSPECIFIED CATARACT: ICD-10-CM

## 2022-05-16 DIAGNOSIS — Z01.818 ENCOUNTER FOR OTHER PREPROCEDURAL EXAMINATION: ICD-10-CM

## 2022-05-16 PROCEDURE — 99214 OFFICE O/P EST MOD 30 MIN: CPT

## 2022-05-16 NOTE — PHYSICAL EXAM
[No Acute Distress] : no acute distress [Well Nourished] : well nourished [Well Developed] : well developed [Well-Appearing] : well-appearing [Normal Sclera/Conjunctiva] : normal sclera/conjunctiva [PERRL] : pupils equal round and reactive to light [EOMI] : extraocular movements intact [Normal Outer Ear/Nose] : the outer ears and nose were normal in appearance [Normal Oropharynx] : the oropharynx was normal [No JVD] : no jugular venous distention [No Lymphadenopathy] : no lymphadenopathy [Supple] : supple [Thyroid Normal, No Nodules] : the thyroid was normal and there were no nodules present [No Respiratory Distress] : no respiratory distress  [No Accessory Muscle Use] : no accessory muscle use [Clear to Auscultation] : lungs were clear to auscultation bilaterally [Normal Rate] : normal rate  [Regular Rhythm] : with a regular rhythm [Normal S1, S2] : normal S1 and S2 [No Murmur] : no murmur heard [No Carotid Bruits] : no carotid bruits [No Abdominal Bruit] : a ~M bruit was not heard ~T in the abdomen [No Varicosities] : no varicosities [Pedal Pulses Present] : the pedal pulses are present [No Edema] : there was no peripheral edema [No Palpable Aorta] : no palpable aorta [No Extremity Clubbing/Cyanosis] : no extremity clubbing/cyanosis [Soft] : abdomen soft [Non Tender] : non-tender [Non-distended] : non-distended [No Masses] : no abdominal mass palpated [No HSM] : no HSM [Normal Bowel Sounds] : normal bowel sounds [Normal Posterior Cervical Nodes] : no posterior cervical lymphadenopathy [Normal Anterior Cervical Nodes] : no anterior cervical lymphadenopathy [No CVA Tenderness] : no CVA  tenderness [No Spinal Tenderness] : no spinal tenderness [No Joint Swelling] : no joint swelling [Grossly Normal Strength/Tone] : grossly normal strength/tone [No Rash] : no rash [Coordination Grossly Intact] : coordination grossly intact [No Focal Deficits] : no focal deficits [Normal Gait] : normal gait [Deep Tendon Reflexes (DTR)] : deep tendon reflexes were 2+ and symmetric [Normal Affect] : the affect was normal [Normal Insight/Judgement] : insight and judgment were intact [de-identified] : bilateral cataract

## 2022-05-16 NOTE — HISTORY OF PRESENT ILLNESS
[No Pertinent Cardiac History] : no history of aortic stenosis, atrial fibrillation, coronary artery disease, recent myocardial infarction, or implantable device/pacemaker [No Pertinent Pulmonary History] : no history of asthma, COPD, sleep apnea, or smoking [No Adverse Anesthesia Reaction] : no adverse anesthesia reaction in self or family member [Aortic Stenosis] : no aortic stenosis [Atrial Fibrillation] : no atrial fibrillation [Coronary Artery Disease] : no coronary artery disease [Recent Myocardial Infarction] : no recent myocardial infarction [Implantable Device/Pacemaker] : no implantable device/pacemaker [Asthma] : no asthma [COPD] : no COPD [Sleep Apnea] : no sleep apnea [Smoker] : not a smoker [Family Member] : no family member with adverse anesthesia reaction/sudden death [Self] : no previous adverse anesthesia reaction [Chronic Anticoagulation] : no chronic anticoagulation [Chronic Kidney Disease] : no chronic kidney disease [FreeTextEntry1] : Bilateral Cataract  [FreeTextEntry2] : 5/16 and 5/26 [FreeTextEntry3] : Dr. Byers [FreeTextEntry4] : Patient to have bilateral cataract surgery.  He has been in excellent health

## 2022-10-26 ENCOUNTER — APPOINTMENT (OUTPATIENT)
Dept: INTERNAL MEDICINE | Facility: CLINIC | Age: 82
End: 2022-10-26

## 2022-10-26 PROCEDURE — 90662 IIV NO PRSV INCREASED AG IM: CPT

## 2022-10-26 PROCEDURE — G0008: CPT

## 2023-02-17 ENCOUNTER — APPOINTMENT (OUTPATIENT)
Dept: INTERNAL MEDICINE | Facility: CLINIC | Age: 83
End: 2023-02-17
Payer: MEDICARE

## 2023-02-17 ENCOUNTER — OUTPATIENT (OUTPATIENT)
Dept: OUTPATIENT SERVICES | Facility: HOSPITAL | Age: 83
LOS: 1 days | End: 2023-02-17
Payer: MEDICARE

## 2023-02-17 ENCOUNTER — APPOINTMENT (OUTPATIENT)
Dept: RADIOLOGY | Facility: CLINIC | Age: 83
End: 2023-02-17
Payer: MEDICARE

## 2023-02-17 VITALS — HEART RATE: 82 BPM | RESPIRATION RATE: 12 BRPM | SYSTOLIC BLOOD PRESSURE: 120 MMHG | DIASTOLIC BLOOD PRESSURE: 80 MMHG

## 2023-02-17 VITALS — BODY MASS INDEX: 22.76 KG/M2 | WEIGHT: 159 LBS | HEIGHT: 70 IN

## 2023-02-17 DIAGNOSIS — M25.562 PAIN IN LEFT KNEE: ICD-10-CM

## 2023-02-17 PROCEDURE — 99212 OFFICE O/P EST SF 10 MIN: CPT

## 2023-02-17 PROCEDURE — 73560 X-RAY EXAM OF KNEE 1 OR 2: CPT | Mod: 26,LT

## 2023-02-17 PROCEDURE — 73560 X-RAY EXAM OF KNEE 1 OR 2: CPT

## 2023-02-17 RX ORDER — MELOXICAM 15 MG/1
15 TABLET ORAL DAILY
Qty: 30 | Refills: 4 | Status: ACTIVE | COMMUNITY
Start: 2023-02-17 | End: 1900-01-01

## 2023-02-17 NOTE — HISTORY OF PRESENT ILLNESS
[FreeTextEntry1] : left knee pain  [de-identified] : left knee pain, one month or so\par hurts in am\par no trauma\par

## 2023-02-17 NOTE — PHYSICAL EXAM
[No Acute Distress] : no acute distress [Well Nourished] : well nourished [Well Developed] : well developed [Well-Appearing] : well-appearing [Normal Sclera/Conjunctiva] : normal sclera/conjunctiva [PERRL] : pupils equal round and reactive to light [EOMI] : extraocular movements intact [Normal Outer Ear/Nose] : the outer ears and nose were normal in appearance [Normal Oropharynx] : the oropharynx was normal [No JVD] : no jugular venous distention [No Lymphadenopathy] : no lymphadenopathy [Supple] : supple [Thyroid Normal, No Nodules] : the thyroid was normal and there were no nodules present [No Respiratory Distress] : no respiratory distress  [No Accessory Muscle Use] : no accessory muscle use [Clear to Auscultation] : lungs were clear to auscultation bilaterally [Normal Rate] : normal rate  [Regular Rhythm] : with a regular rhythm [Normal S1, S2] : normal S1 and S2 [No Murmur] : no murmur heard [No Carotid Bruits] : no carotid bruits [No Abdominal Bruit] : a ~M bruit was not heard ~T in the abdomen [No Varicosities] : no varicosities [Pedal Pulses Present] : the pedal pulses are present [No Edema] : there was no peripheral edema [No Palpable Aorta] : no palpable aorta [No Extremity Clubbing/Cyanosis] : no extremity clubbing/cyanosis [Soft] : abdomen soft [Non Tender] : non-tender [Non-distended] : non-distended [No Masses] : no abdominal mass palpated [No HSM] : no HSM [Normal Bowel Sounds] : normal bowel sounds [Normal Posterior Cervical Nodes] : no posterior cervical lymphadenopathy [Normal Anterior Cervical Nodes] : no anterior cervical lymphadenopathy [No CVA Tenderness] : no CVA  tenderness [No Spinal Tenderness] : no spinal tenderness [No Joint Swelling] : no joint swelling [Grossly Normal Strength/Tone] : grossly normal strength/tone [No Rash] : no rash [Coordination Grossly Intact] : coordination grossly intact [No Focal Deficits] : no focal deficits [Normal Gait] : normal gait [Deep Tendon Reflexes (DTR)] : deep tendon reflexes were 2+ and symmetric [Normal Affect] : the affect was normal [Normal Insight/Judgement] : insight and judgment were intact [de-identified] : mild left knee pain

## 2023-02-22 ENCOUNTER — NON-APPOINTMENT (OUTPATIENT)
Age: 83
End: 2023-02-22

## 2023-03-17 ENCOUNTER — OFFICE (OUTPATIENT)
Dept: URBAN - METROPOLITAN AREA CLINIC 104 | Facility: CLINIC | Age: 83
Setting detail: OPHTHALMOLOGY
End: 2023-03-17
Payer: MEDICARE

## 2023-03-17 DIAGNOSIS — H35.54: ICD-10-CM

## 2023-03-17 DIAGNOSIS — H43.813: ICD-10-CM

## 2023-03-17 DIAGNOSIS — H53.002: ICD-10-CM

## 2023-03-17 DIAGNOSIS — H01.001: ICD-10-CM

## 2023-03-17 DIAGNOSIS — H01.005: ICD-10-CM

## 2023-03-17 DIAGNOSIS — H01.002: ICD-10-CM

## 2023-03-17 DIAGNOSIS — Z96.1: ICD-10-CM

## 2023-03-17 DIAGNOSIS — H01.004: ICD-10-CM

## 2023-03-17 PROCEDURE — 99213 OFFICE O/P EST LOW 20 MIN: CPT | Performed by: OPHTHALMOLOGY

## 2023-03-17 ASSESSMENT — KERATOMETRY
OS_K2POWER_DIOPTERS: 44.82
OD_K2POWER_DIOPTERS: 44.35
OS_K1POWER_DIOPTERS: 43.44
OD_AXISANGLE_DEGREES: 010
OD_K1POWER_DIOPTERS: 42.72
OS_AXISANGLE_DEGREES: 172

## 2023-03-17 ASSESSMENT — REFRACTION_AUTOREFRACTION
OS_AXIS: 086
OD_AXIS: 089
OS_SPHERE: +1.25
OD_CYLINDER: -2.25
OS_CYLINDER: -2.00
OD_SPHERE: +1.25

## 2023-03-17 ASSESSMENT — REFRACTION_CURRENTRX
OD_SPHERE: +1.00
OD_OVR_VA: 20/
OD_AXIS: 93
OS_ADD: +2.00
OD_ADD: +2.00
OD_VPRISM_DIRECTION: PROGS
OS_VPRISM_DIRECTION: PROGS
OS_OVR_VA: 20/
OS_CYLINDER: -1.25
OS_AXIS: 082
OD_CYLINDER: -1.75
OS_SPHERE: +1.00

## 2023-03-17 ASSESSMENT — CONFRONTATIONAL VISUAL FIELD TEST (CVF)
OD_FINDINGS: FULL
OS_FINDINGS: FULL

## 2023-03-17 ASSESSMENT — LID EXAM ASSESSMENTS
OD_BLEPHARITIS: RLL RUL 1+ 2+
OS_BLEPHARITIS: LLL LUL 1+ 2+

## 2023-03-17 ASSESSMENT — AXIALLENGTH_DERIVED
OS_AL: 23.2676
OD_AL: 23.53

## 2023-03-17 ASSESSMENT — TONOMETRY
OD_IOP_MMHG: 14
OS_IOP_MMHG: 14

## 2023-03-17 ASSESSMENT — VISUAL ACUITY
OD_BCVA: 20/20-2
OS_BCVA: 20/25+2

## 2023-03-17 ASSESSMENT — SPHEQUIV_DERIVED
OS_SPHEQUIV: 0.25
OD_SPHEQUIV: 0.125

## 2023-04-21 ENCOUNTER — NON-APPOINTMENT (OUTPATIENT)
Age: 83
End: 2023-04-21

## 2023-04-21 ENCOUNTER — APPOINTMENT (OUTPATIENT)
Dept: INTERNAL MEDICINE | Facility: CLINIC | Age: 83
End: 2023-04-21
Payer: MEDICARE

## 2023-04-21 VITALS
BODY MASS INDEX: 22.62 KG/M2 | RESPIRATION RATE: 16 BRPM | HEART RATE: 72 BPM | WEIGHT: 158 LBS | HEIGHT: 70 IN | DIASTOLIC BLOOD PRESSURE: 78 MMHG | SYSTOLIC BLOOD PRESSURE: 118 MMHG

## 2023-04-21 DIAGNOSIS — Z00.00 ENCOUNTER FOR GENERAL ADULT MEDICAL EXAMINATION W/OUT ABNORMAL FINDINGS: ICD-10-CM

## 2023-04-21 PROCEDURE — G0439: CPT

## 2023-04-21 PROCEDURE — 36415 COLL VENOUS BLD VENIPUNCTURE: CPT

## 2023-04-21 PROCEDURE — 93000 ELECTROCARDIOGRAM COMPLETE: CPT | Mod: 59

## 2023-04-21 NOTE — HEALTH RISK ASSESSMENT
[Excellent] : ~his/her~  mood as  excellent [No falls in past year] : Patient reported no falls in the past year [0] : 2) Feeling down, depressed, or hopeless: Not at all (0) [PHQ-2 Negative - No further assessment needed] : PHQ-2 Negative - No further assessment needed [HIV test declined] : HIV test declined [Hepatitis C test declined] : Hepatitis C test declined [None] : None [With Significant Other] : lives with significant other [Retired] : retired [College] : College [] :  [Sexually Active] : sexually active [Feels Safe at Home] : Feels safe at home [Fully functional (bathing, dressing, toileting, transferring, walking, feeding)] : Fully functional (bathing, dressing, toileting, transferring, walking, feeding) [Fully functional (using the telephone, shopping, preparing meals, housekeeping, doing laundry, using] : Fully functional and needs no help or supervision to perform IADLs (using the telephone, shopping, preparing meals, housekeeping, doing laundry, using transportation, managing medications and managing finances) [Smoke Detector] : smoke detector [Safety elements used in home] : safety elements used in home [Seat Belt] :  uses seat belt [[id number]] : 0 [Change in mental status noted] : No change in mental status noted [Language] : denies difficulty with language [Behavior] : denies difficulty with behavior [Learning/Retaining New Information] : denies difficulty learning/retaining new information [Handling Complex Tasks] : denies difficulty handling complex tasks [Reasoning] : denies difficulty with reasoning [Spatial Ability and Orientation] : denies difficulty with spatial ability and orientation [High Risk Behavior] : no high risk behavior [Reports changes in hearing] : Reports no changes in hearing [Reports changes in vision] : Reports no changes in vision [Reports normal functional visual acuity (ie: able to read med bottle)] : Reports poor functional visual acuity.  [Reports changes in dental health] : Reports no changes in dental health [Guns at Home] : no guns at home [Sunscreen] : does not use sunscreen [Travel to Developing Areas] : does not  travel to developing areas [TB Exposure] : is not being exposed to tuberculosis [Caregiver Concerns] : does not have caregiver concerns [ColonoscopyDate] : 2020 [Never] : Never

## 2023-04-22 LAB
ALBUMIN SERPL ELPH-MCNC: 4.6 G/DL
ALP BLD-CCNC: 66 U/L
ALT SERPL-CCNC: 19 U/L
ANION GAP SERPL CALC-SCNC: 17 MMOL/L
APPEARANCE: CLEAR
AST SERPL-CCNC: 21 U/L
BASOPHILS # BLD AUTO: 0.04 K/UL
BASOPHILS NFR BLD AUTO: 0.6 %
BILIRUB SERPL-MCNC: 0.4 MG/DL
BILIRUBIN URINE: NEGATIVE
BLOOD URINE: NEGATIVE
BUN SERPL-MCNC: 26 MG/DL
CALCIUM SERPL-MCNC: 9.8 MG/DL
CHLORIDE SERPL-SCNC: 105 MMOL/L
CHOLEST SERPL-MCNC: 194 MG/DL
CO2 SERPL-SCNC: 21 MMOL/L
COLOR: YELLOW
CREAT SERPL-MCNC: 1.09 MG/DL
CREAT SPEC-SCNC: 106 MG/DL
EGFR: 68 ML/MIN/1.73M2
EOSINOPHIL # BLD AUTO: 0.29 K/UL
EOSINOPHIL NFR BLD AUTO: 4.3 %
ESTIMATED AVERAGE GLUCOSE: 114 MG/DL
GLUCOSE QUALITATIVE U: NEGATIVE MG/DL
GLUCOSE SERPL-MCNC: 94 MG/DL
HBA1C MFR BLD HPLC: 5.6 %
HCT VFR BLD CALC: 41.4 %
HDLC SERPL-MCNC: 55 MG/DL
HGB BLD-MCNC: 13.5 G/DL
IMM GRANULOCYTES NFR BLD AUTO: 0.1 %
KETONES URINE: NEGATIVE MG/DL
LDLC SERPL CALC-MCNC: 117 MG/DL
LEUKOCYTE ESTERASE URINE: NEGATIVE
LYMPHOCYTES # BLD AUTO: 1.12 K/UL
LYMPHOCYTES NFR BLD AUTO: 16.4 %
MAN DIFF?: NORMAL
MCHC RBC-ENTMCNC: 30.8 PG
MCHC RBC-ENTMCNC: 32.6 GM/DL
MCV RBC AUTO: 94.5 FL
MICROALBUMIN 24H UR DL<=1MG/L-MCNC: <1.2 MG/DL
MICROALBUMIN/CREAT 24H UR-RTO: NORMAL MG/G
MONOCYTES # BLD AUTO: 0.39 K/UL
MONOCYTES NFR BLD AUTO: 5.7 %
NEUTROPHILS # BLD AUTO: 4.97 K/UL
NEUTROPHILS NFR BLD AUTO: 72.9 %
NITRITE URINE: NEGATIVE
NONHDLC SERPL-MCNC: 139 MG/DL
PH URINE: 5.5
PLATELET # BLD AUTO: 290 K/UL
POTASSIUM SERPL-SCNC: 4.7 MMOL/L
PROT SERPL-MCNC: 6.6 G/DL
PROTEIN URINE: NEGATIVE MG/DL
PSA SERPL-MCNC: 0.67 NG/ML
RBC # BLD: 4.38 M/UL
RBC # FLD: 13.7 %
SODIUM SERPL-SCNC: 142 MMOL/L
SPECIFIC GRAVITY URINE: 1.02
T4 FREE SERPL-MCNC: 1.2 NG/DL
TRIGL SERPL-MCNC: 109 MG/DL
TSH SERPL-ACNC: 2.75 UIU/ML
UROBILINOGEN URINE: 0.2 MG/DL
WBC # FLD AUTO: 6.82 K/UL

## 2023-05-05 ENCOUNTER — NON-APPOINTMENT (OUTPATIENT)
Age: 83
End: 2023-05-05

## 2023-10-19 ENCOUNTER — APPOINTMENT (OUTPATIENT)
Dept: INTERNAL MEDICINE | Facility: CLINIC | Age: 83
End: 2023-10-19
Payer: MEDICARE

## 2023-10-19 PROCEDURE — G0008: CPT

## 2023-10-19 PROCEDURE — 90662 IIV NO PRSV INCREASED AG IM: CPT

## 2023-12-15 ENCOUNTER — OFFICE (OUTPATIENT)
Dept: URBAN - METROPOLITAN AREA CLINIC 104 | Facility: CLINIC | Age: 83
Setting detail: OPHTHALMOLOGY
End: 2023-12-15
Payer: MEDICARE

## 2023-12-15 DIAGNOSIS — H01.005: ICD-10-CM

## 2023-12-15 DIAGNOSIS — Z96.1: ICD-10-CM

## 2023-12-15 DIAGNOSIS — H35.54: ICD-10-CM

## 2023-12-15 DIAGNOSIS — H53.002: ICD-10-CM

## 2023-12-15 DIAGNOSIS — H43.813: ICD-10-CM

## 2023-12-15 DIAGNOSIS — H01.004: ICD-10-CM

## 2023-12-15 DIAGNOSIS — H01.001: ICD-10-CM

## 2023-12-15 DIAGNOSIS — H01.002: ICD-10-CM

## 2023-12-15 PROCEDURE — 92134 CPTRZ OPH DX IMG PST SGM RTA: CPT | Performed by: OPHTHALMOLOGY

## 2023-12-15 PROCEDURE — 92014 COMPRE OPH EXAM EST PT 1/>: CPT | Performed by: OPHTHALMOLOGY

## 2023-12-15 ASSESSMENT — LID EXAM ASSESSMENTS
OS_BLEPHARITIS: LLL LUL 1+ 2+
OD_BLEPHARITIS: RLL RUL 1+ 2+

## 2023-12-15 ASSESSMENT — CONFRONTATIONAL VISUAL FIELD TEST (CVF)
OD_FINDINGS: FULL
OS_FINDINGS: FULL

## 2023-12-15 ASSESSMENT — REFRACTION_CURRENTRX
OD_OVR_VA: 20/
OS_OVR_VA: 20/

## 2024-05-29 ENCOUNTER — APPOINTMENT (OUTPATIENT)
Dept: INTERNAL MEDICINE | Facility: CLINIC | Age: 84
End: 2024-05-29
Payer: MEDICARE

## 2024-05-29 VITALS
DIASTOLIC BLOOD PRESSURE: 72 MMHG | RESPIRATION RATE: 12 BRPM | SYSTOLIC BLOOD PRESSURE: 122 MMHG | HEART RATE: 78 BPM | BODY MASS INDEX: 22.62 KG/M2 | WEIGHT: 158 LBS | HEIGHT: 70 IN

## 2024-05-29 DIAGNOSIS — H91.93 UNSPECIFIED HEARING LOSS, BILATERAL: ICD-10-CM

## 2024-05-29 DIAGNOSIS — D17.0 BENIGN LIPOMATOUS NEOPLASM OF SKIN AND SUBCUTANEOUS TISSUE OF HEAD, FACE AND NECK: ICD-10-CM

## 2024-05-29 PROCEDURE — 99213 OFFICE O/P EST LOW 20 MIN: CPT

## 2024-05-29 NOTE — HISTORY OF PRESENT ILLNESS
[FreeTextEntry1] : growth scalp and neck [de-identified] : growth scalp and neck has no pain was bigger a week ago no fever no sob nvd or palpitatons

## 2024-06-14 ENCOUNTER — OFFICE (OUTPATIENT)
Dept: URBAN - METROPOLITAN AREA CLINIC 104 | Facility: CLINIC | Age: 84
Setting detail: OPHTHALMOLOGY
End: 2024-06-14
Payer: MEDICARE

## 2024-06-14 DIAGNOSIS — Z96.1: ICD-10-CM

## 2024-06-14 DIAGNOSIS — H53.002: ICD-10-CM

## 2024-06-14 DIAGNOSIS — H01.001: ICD-10-CM

## 2024-06-14 DIAGNOSIS — H01.005: ICD-10-CM

## 2024-06-14 DIAGNOSIS — H35.54: ICD-10-CM

## 2024-06-14 DIAGNOSIS — H43.813: ICD-10-CM

## 2024-06-14 DIAGNOSIS — H01.004: ICD-10-CM

## 2024-06-14 DIAGNOSIS — H01.002: ICD-10-CM

## 2024-06-14 PROCEDURE — 99213 OFFICE O/P EST LOW 20 MIN: CPT | Performed by: OPHTHALMOLOGY

## 2024-06-14 PROCEDURE — 92134 CPTRZ OPH DX IMG PST SGM RTA: CPT | Performed by: OPHTHALMOLOGY

## 2024-06-14 ASSESSMENT — LID EXAM ASSESSMENTS
OD_BLEPHARITIS: RLL RUL 1+ 2+
OS_BLEPHARITIS: LLL LUL 1+ 2+

## 2024-06-14 ASSESSMENT — CONFRONTATIONAL VISUAL FIELD TEST (CVF)
OD_FINDINGS: FULL
OS_FINDINGS: FULL

## 2024-08-09 ENCOUNTER — NON-APPOINTMENT (OUTPATIENT)
Age: 84
End: 2024-08-09

## 2024-09-13 ENCOUNTER — NON-APPOINTMENT (OUTPATIENT)
Age: 84
End: 2024-09-13

## 2024-09-13 ENCOUNTER — APPOINTMENT (OUTPATIENT)
Dept: INTERNAL MEDICINE | Facility: CLINIC | Age: 84
End: 2024-09-13
Payer: MEDICARE

## 2024-09-13 DIAGNOSIS — K55.21 ANGIODYSPLASIA OF COLON WITH HEMORRHAGE: ICD-10-CM

## 2024-09-13 DIAGNOSIS — Z00.00 ENCOUNTER FOR GENERAL ADULT MEDICAL EXAMINATION W/OUT ABNORMAL FINDINGS: ICD-10-CM

## 2024-09-13 DIAGNOSIS — Z23 ENCOUNTER FOR IMMUNIZATION: ICD-10-CM

## 2024-09-13 PROCEDURE — 93000 ELECTROCARDIOGRAM COMPLETE: CPT | Mod: 59

## 2024-09-13 PROCEDURE — G0439: CPT

## 2024-09-13 PROCEDURE — 90677 PCV20 VACCINE IM: CPT

## 2024-09-13 PROCEDURE — G0009: CPT

## 2024-09-13 PROCEDURE — 90656 IIV3 VACC NO PRSV 0.5 ML IM: CPT

## 2024-09-13 PROCEDURE — 36415 COLL VENOUS BLD VENIPUNCTURE: CPT

## 2024-09-13 PROCEDURE — G0008: CPT

## 2024-09-13 PROCEDURE — G0444 DEPRESSION SCREEN ANNUAL: CPT

## 2024-09-13 NOTE — HEALTH RISK ASSESSMENT
[Excellent] : ~his/her~  mood as  excellent [Yes] : Yes [No falls in past year] : Patient reported no falls in the past year [Little interest or pleasure doing things] : 1) Little interest or pleasure doing things [Feeling down, depressed, or hopeless] : 2) Feeling down, depressed, or hopeless [0] : 2) Feeling down, depressed, or hopeless: Not at all (0) [PHQ-2 Negative - No further assessment needed] : PHQ-2 Negative - No further assessment needed [PCY9Ectes] : 0 [Never] : Never [NO] : No [HIV test declined] : HIV test declined [Hepatitis C test declined] : Hepatitis C test declined [Change in mental status noted] : No change in mental status noted [Language] : denies difficulty with language [Behavior] : denies difficulty with behavior [Learning/Retaining New Information] : denies difficulty learning/retaining new information [Handling Complex Tasks] : denies difficulty handling complex tasks [Reasoning] : denies difficulty with reasoning [Spatial Ability and Orientation] : denies difficulty with spatial ability and orientation [ColonoscopyDate] : 2020 [ColonoscopyComments] : avm

## 2024-09-14 LAB
ALBUMIN SERPL ELPH-MCNC: 4.3 G/DL
ALP BLD-CCNC: 76 U/L
ALT SERPL-CCNC: 15 U/L
ANION GAP SERPL CALC-SCNC: 11 MMOL/L
APPEARANCE: CLEAR
AST SERPL-CCNC: 20 U/L
BACTERIA: NEGATIVE /HPF
BASOPHILS # BLD AUTO: 0.07 K/UL
BASOPHILS NFR BLD AUTO: 1 %
BILIRUB SERPL-MCNC: 0.5 MG/DL
BILIRUBIN URINE: NEGATIVE
BLOOD URINE: NEGATIVE
BUN SERPL-MCNC: 27 MG/DL
CALCIUM SERPL-MCNC: 9.3 MG/DL
CAST: 0 /LPF
CHLORIDE SERPL-SCNC: 109 MMOL/L
CHOLEST SERPL-MCNC: 188 MG/DL
CO2 SERPL-SCNC: 23 MMOL/L
COLOR: NORMAL
CREAT SERPL-MCNC: 1.25 MG/DL
CREAT SPEC-SCNC: 215 MG/DL
EGFR: 57 ML/MIN/1.73M2
EOSINOPHIL # BLD AUTO: 0.18 K/UL
EOSINOPHIL NFR BLD AUTO: 2.6 %
EPITHELIAL CELLS: 0 /HPF
ESTIMATED AVERAGE GLUCOSE: 108 MG/DL
GLUCOSE QUALITATIVE U: NEGATIVE MG/DL
GLUCOSE SERPL-MCNC: 97 MG/DL
HBA1C MFR BLD HPLC: 5.4 %
HCT VFR BLD CALC: 39.6 %
HDLC SERPL-MCNC: 52 MG/DL
HGB BLD-MCNC: 13.2 G/DL
IMM GRANULOCYTES NFR BLD AUTO: 0.3 %
KETONES URINE: ABNORMAL MG/DL
LDLC SERPL CALC-MCNC: 116 MG/DL
LEUKOCYTE ESTERASE URINE: NEGATIVE
LYMPHOCYTES # BLD AUTO: 1.4 K/UL
LYMPHOCYTES NFR BLD AUTO: 20.2 %
MAN DIFF?: NORMAL
MCHC RBC-ENTMCNC: 30.5 PG
MCHC RBC-ENTMCNC: 33.3 GM/DL
MCV RBC AUTO: 91.5 FL
MICROALBUMIN 24H UR DL<=1MG/L-MCNC: 1.5 MG/DL
MICROALBUMIN/CREAT 24H UR-RTO: 7 MG/G
MICROSCOPIC-UA: NORMAL
MONOCYTES # BLD AUTO: 0.44 K/UL
MONOCYTES NFR BLD AUTO: 6.3 %
NEUTROPHILS # BLD AUTO: 4.82 K/UL
NEUTROPHILS NFR BLD AUTO: 69.6 %
NITRITE URINE: NEGATIVE
NONHDLC SERPL-MCNC: 137 MG/DL
PH URINE: 5.5
PLATELET # BLD AUTO: 282 K/UL
POTASSIUM SERPL-SCNC: 4.3 MMOL/L
PROT SERPL-MCNC: 7 G/DL
PROTEIN URINE: NEGATIVE MG/DL
PSA SERPL-MCNC: 0.67 NG/ML
RBC # BLD: 4.33 M/UL
RBC # FLD: 13.9 %
RED BLOOD CELLS URINE: 1 /HPF
SODIUM SERPL-SCNC: 143 MMOL/L
SPECIFIC GRAVITY URINE: 1.03
T4 FREE SERPL-MCNC: 1.1 NG/DL
TRIGL SERPL-MCNC: 117 MG/DL
TSH SERPL-ACNC: 2.91 UIU/ML
UROBILINOGEN URINE: 0.2 MG/DL
WBC # FLD AUTO: 6.93 K/UL
WHITE BLOOD CELLS URINE: 3 /HPF

## 2025-02-18 ENCOUNTER — APPOINTMENT (OUTPATIENT)
Dept: GASTROENTEROLOGY | Facility: CLINIC | Age: 85
End: 2025-02-18

## 2025-02-26 ENCOUNTER — APPOINTMENT (OUTPATIENT)
Dept: GASTROENTEROLOGY | Facility: CLINIC | Age: 85
End: 2025-02-26
Payer: MEDICARE

## 2025-02-26 VITALS
BODY MASS INDEX: 23.48 KG/M2 | HEIGHT: 70 IN | OXYGEN SATURATION: 97 % | SYSTOLIC BLOOD PRESSURE: 140 MMHG | HEART RATE: 69 BPM | WEIGHT: 164 LBS | DIASTOLIC BLOOD PRESSURE: 82 MMHG

## 2025-02-26 DIAGNOSIS — Z86.0100 PERSONAL HISTORY OF COLON POLYPS, UNSPECIFIED: ICD-10-CM

## 2025-02-26 DIAGNOSIS — K55.21 ANGIODYSPLASIA OF COLON WITH HEMORRHAGE: ICD-10-CM

## 2025-02-26 PROCEDURE — 99203 OFFICE O/P NEW LOW 30 MIN: CPT

## 2025-02-26 RX ORDER — POLYETHYLENE GLYCOL-3350, SODIUM CHLORIDE, POTASSIUM CHLORIDE AND SODIUM BICARBONATE 420; 11.2; 5.72; 1.48 G/438.4G; G/438.4G; G/438.4G; G/438.4G
420 POWDER, FOR SOLUTION ORAL
Qty: 1 | Refills: 0 | Status: ACTIVE | COMMUNITY
Start: 2025-02-26 | End: 1900-01-01

## 2025-06-04 ENCOUNTER — APPOINTMENT (OUTPATIENT)
Dept: GASTROENTEROLOGY | Facility: GI CENTER | Age: 85
End: 2025-06-04
Payer: MEDICARE

## 2025-06-04 ENCOUNTER — TRANSCRIPTION ENCOUNTER (OUTPATIENT)
Age: 85
End: 2025-06-04

## 2025-06-04 ENCOUNTER — OUTPATIENT (OUTPATIENT)
Dept: OUTPATIENT SERVICES | Facility: HOSPITAL | Age: 85
LOS: 1 days | End: 2025-06-04
Payer: MEDICARE

## 2025-06-04 ENCOUNTER — RESULT REVIEW (OUTPATIENT)
Age: 85
End: 2025-06-04

## 2025-06-04 DIAGNOSIS — K55.21 ANGIODYSPLASIA OF COLON WITH HEMORRHAGE: ICD-10-CM

## 2025-06-04 DIAGNOSIS — Z82.3 FAMILY HISTORY OF STROKE: ICD-10-CM

## 2025-06-04 PROCEDURE — 45380 COLONOSCOPY AND BIOPSY: CPT | Mod: XS,PT

## 2025-06-04 PROCEDURE — 88305 TISSUE EXAM BY PATHOLOGIST: CPT | Mod: 26

## 2025-06-04 PROCEDURE — 45385 COLONOSCOPY W/LESION REMOVAL: CPT | Mod: PT

## 2025-06-04 PROCEDURE — 88305 TISSUE EXAM BY PATHOLOGIST: CPT

## 2025-06-04 PROCEDURE — 45380 COLONOSCOPY AND BIOPSY: CPT | Mod: 59,PT

## 2025-06-06 LAB — SURGICAL PATHOLOGY STUDY: SIGNIFICANT CHANGE UP

## 2025-06-11 ENCOUNTER — NON-APPOINTMENT (OUTPATIENT)
Age: 85
End: 2025-06-11

## (undated) DEVICE — SNARE MINI-MICRO OVAL

## (undated) DEVICE — ELCTR REM POLYHESIVE ADULT PT RETURN 15FT

## (undated) DEVICE — FORCEP RADIAL JAW 4 JUMBO W NDL 2.8MM 3.2MM 240CM ORANGE DISP

## (undated) DEVICE — KIT DEFENDO 4 OLY 4 PC

## (undated) DEVICE — POLY TRAP ETRAP